# Patient Record
(demographics unavailable — no encounter records)

---

## 2019-10-09 NOTE — XRAY
Indication: Lumbar ULYSSES.



Intraoperative fluoroscopy was provided for 14 seconds.  2 digital spot images

submitted for interpretation demonstrate midline posterior needle tip

projecting posterior to the L4 segment.  Small amount of contrast injected for

needle tip placement.  Correlate with intraoperative findings/report.

Incidental L5-S1 posterior fusion hardware and intervertebral spacer